# Patient Record
Sex: FEMALE | Race: WHITE | NOT HISPANIC OR LATINO | Employment: STUDENT | URBAN - METROPOLITAN AREA
[De-identification: names, ages, dates, MRNs, and addresses within clinical notes are randomized per-mention and may not be internally consistent; named-entity substitution may affect disease eponyms.]

---
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
## 2024-03-18 ENCOUNTER — OFFICE VISIT (OUTPATIENT)
Dept: FAMILY MEDICINE CLINIC | Facility: CLINIC | Age: 19
End: 2024-03-18
Payer: COMMERCIAL

## 2024-03-18 VITALS
RESPIRATION RATE: 16 BRPM | SYSTOLIC BLOOD PRESSURE: 120 MMHG | HEART RATE: 73 BPM | DIASTOLIC BLOOD PRESSURE: 76 MMHG | HEIGHT: 68 IN | BODY MASS INDEX: 22.76 KG/M2 | TEMPERATURE: 97.1 F | OXYGEN SATURATION: 98 % | WEIGHT: 150.2 LBS

## 2024-03-18 DIAGNOSIS — L68.0 HIRSUTISM: ICD-10-CM

## 2024-03-18 DIAGNOSIS — L70.0 ACNE VULGARIS: ICD-10-CM

## 2024-03-18 DIAGNOSIS — F41.9 ANXIETY: Primary | ICD-10-CM

## 2024-03-18 PROCEDURE — 99203 OFFICE O/P NEW LOW 30 MIN: CPT | Performed by: FAMILY MEDICINE

## 2024-03-18 RX ORDER — SERTRALINE HYDROCHLORIDE 25 MG/1
25 TABLET, FILM COATED ORAL DAILY
Qty: 90 TABLET | Refills: 1 | Status: SHIPPED | OUTPATIENT
Start: 2024-03-18

## 2024-03-18 RX ORDER — SPIRONOLACTONE 100 MG/1
100 TABLET, FILM COATED ORAL DAILY
Qty: 90 TABLET | Refills: 1 | Status: SHIPPED | OUTPATIENT
Start: 2024-03-18

## 2024-03-18 RX ORDER — SPIRONOLACTONE 100 MG/1
100 TABLET, FILM COATED ORAL DAILY
COMMUNITY
Start: 2024-01-03 | End: 2024-03-18 | Stop reason: SDUPTHER

## 2024-03-18 RX ORDER — SERTRALINE HYDROCHLORIDE 25 MG/1
25 TABLET, FILM COATED ORAL DAILY
COMMUNITY
End: 2024-03-18 | Stop reason: SDUPTHER

## 2024-03-18 NOTE — PROGRESS NOTES
Subjective     Patience Calle is a 18 y.o. female.     Chief Complaint   Patient presents with    [facility]    Med Refill    acne and hirsutism        History of Present Illness     To establish care, discuss the followings ;      Acne with Hirsutism ; doing well on spironolactone , no S/E reported.  She is also using some topical on her face for the acne    Anxiety; she I on zoloft for many years , runs out of it for 2 weeks.  No S/E reported     Labs and documentation from previous PCP / consulting physician has been reviewed          The following portions of the patient's history were reviewed and updated as appropriate: allergies, current medications, past family history, past medical history, past social history, past surgical history, and problem list.        Review of Systems   Psychiatric/Behavioral:  The patient is nervous/anxious.        Vitals:    03/18/24 1420   BP: 120/76   Pulse: 73   Resp: 16   Temp: 97.1 °F (36.2 °C)   SpO2: 98%           03/18/24  1420   Weight: 68.1 kg (150 lb 3.2 oz)         Body mass index is 22.84 kg/m².      Current Outpatient Medications   Medication Sig Dispense Refill    sertraline (ZOLOFT) 25 MG tablet Take 1 tablet by mouth Daily. 90 tablet 1    spironolactone (ALDACTONE) 100 MG tablet Take 1 tablet by mouth Daily. 90 tablet 1     No current facility-administered medications for this visit.                Objective   Physical Exam  Vitals and nursing note reviewed.   Constitutional:       General: She is not in acute distress.     Appearance: She is not toxic-appearing.   Cardiovascular:      Rate and Rhythm: Normal rate and regular rhythm.      Heart sounds: Normal heart sounds. No murmur heard.  Pulmonary:      Effort: Pulmonary effort is normal. No respiratory distress.      Breath sounds: Normal breath sounds. No stridor. No wheezing or rhonchi.   Neurological:      Mental Status: She is alert and oriented to person, place, and time.   Psychiatric:          Mood and Affect: Mood normal.         Behavior: Behavior normal.           Assessment & Plan   Diagnoses and all orders for this visit:    1. Anxiety (Primary)  -     sertraline (ZOLOFT) 25 MG tablet; Take 1 tablet by mouth Daily.  Dispense: 90 tablet; Refill: 1    2. Hirsutism  -     spironolactone (ALDACTONE) 100 MG tablet; Take 1 tablet by mouth Daily.  Dispense: 90 tablet; Refill: 1    3. Acne vulgaris  -     spironolactone (ALDACTONE) 100 MG tablet; Take 1 tablet by mouth Daily.  Dispense: 90 tablet; Refill: 1        Patient was given instructions and counseling regarding her condition or for health maintenance advice.   Please see specific information pulled into the AVS if appropriate.       I have fully discussed the nature of the medical condition(s) risks, complications, management, safe and proper use of medications.   Pt stated no allergy to the above prescribed medication.  I have discussed the SIDE EFFECT OF MEDICATION and importance TO report any side effect , the patient expressed good understanding.  Encouraged medication compliance and the importance of keeping scheduled follow up appointments with me and any other providers.    Patient instructed to follow up with our office for results on any labs/imaging ordered during this visit.    Home care discussed  All questions answered  Patient verbalizes understanding and agrees to treatment plan.     Follow up: Return in about 6 months (around 9/18/2024) for physical.

## 2024-04-11 ENCOUNTER — TELEPHONE (OUTPATIENT)
Dept: FAMILY MEDICINE CLINIC | Facility: CLINIC | Age: 19
End: 2024-04-11
Payer: COMMERCIAL

## 2024-04-11 DIAGNOSIS — L68.0 HIRSUTISM: ICD-10-CM

## 2024-04-11 DIAGNOSIS — F41.9 ANXIETY: ICD-10-CM

## 2024-04-11 DIAGNOSIS — L70.0 ACNE VULGARIS: ICD-10-CM

## 2024-04-11 RX ORDER — SPIRONOLACTONE 100 MG/1
100 TABLET, FILM COATED ORAL DAILY
Qty: 90 TABLET | Refills: 1 | Status: SHIPPED | OUTPATIENT
Start: 2024-04-11

## 2024-04-11 RX ORDER — SERTRALINE HYDROCHLORIDE 25 MG/1
25 TABLET, FILM COATED ORAL DAILY
Qty: 90 TABLET | Refills: 1 | Status: SHIPPED | OUTPATIENT
Start: 2024-04-11

## 2024-04-11 NOTE — TELEPHONE ENCOUNTER
Confirmed - pt did not  either rx and pharmacy reshelved. Both have been resent to pharmacy. Pt's mom informed v/u

## 2024-04-11 NOTE — TELEPHONE ENCOUNTER
Caller: SHAHZAD Calle    Relationship: Mother    Best call back number: [phone]     Which medication are you concerned about:     sertraline (ZOLOFT) 25 MG tablet       Who prescribed you this medication: DR MUNOZ        What are your concerns: PATIENT'S MOTHER IS CALLING TO STATE PATIENT DID NOT  MEDICATION AND PHARMACY PUT IT BACK IN STOCK.  SHE IS WANTING TO KNOW IF DR MUNOZ WOULD SEND AGAIN.        [id number] - [place] [address] [address] - [phone]  - [phone]  [phone]     PLEASE ADVISE.

## 2024-07-09 ENCOUNTER — OFFICE VISIT (OUTPATIENT)
Dept: FAMILY MEDICINE CLINIC | Facility: CLINIC | Age: 19
End: 2024-07-09
Payer: COMMERCIAL

## 2024-07-09 VITALS
SYSTOLIC BLOOD PRESSURE: 120 MMHG | TEMPERATURE: 97.8 F | BODY MASS INDEX: 23.01 KG/M2 | HEART RATE: 90 BPM | DIASTOLIC BLOOD PRESSURE: 64 MMHG | WEIGHT: 151.8 LBS | OXYGEN SATURATION: 98 % | HEIGHT: 68 IN | RESPIRATION RATE: 16 BRPM

## 2024-07-09 DIAGNOSIS — N39.0 ACUTE UTI: ICD-10-CM

## 2024-07-09 DIAGNOSIS — R35.0 URINE FREQUENCY: Primary | ICD-10-CM

## 2024-07-09 LAB
BILIRUB BLD-MCNC: NEGATIVE MG/DL
CLARITY, POC: CLEAR
COLOR UR: YELLOW
EXPIRATION DATE: ABNORMAL
GLUCOSE UR STRIP-MCNC: NEGATIVE MG/DL
KETONES UR QL: NEGATIVE
LEUKOCYTE EST, POC: ABNORMAL
Lab: ABNORMAL
NITRITE UR-MCNC: POSITIVE MG/ML
PH UR: 7.5 [PH] (ref 5–8)
PROT UR STRIP-MCNC: NEGATIVE MG/DL
RBC # UR STRIP: NEGATIVE /UL
SP GR UR: 1.02 (ref 1–1.03)
UROBILINOGEN UR QL: ABNORMAL

## 2024-07-09 PROCEDURE — 81003 URINALYSIS AUTO W/O SCOPE: CPT | Performed by: FAMILY MEDICINE

## 2024-07-09 PROCEDURE — 99213 OFFICE O/P EST LOW 20 MIN: CPT | Performed by: FAMILY MEDICINE

## 2024-07-09 RX ORDER — PHENAZOPYRIDINE HYDROCHLORIDE 100 MG/1
100 TABLET, FILM COATED ORAL 2 TIMES DAILY
Qty: 4 TABLET | Refills: 0 | Status: SHIPPED | OUTPATIENT
Start: 2024-07-09

## 2024-07-09 RX ORDER — NITROFURANTOIN 25; 75 MG/1; MG/1
100 CAPSULE ORAL 2 TIMES DAILY
Qty: 14 CAPSULE | Refills: 0 | Status: SHIPPED | OUTPATIENT
Start: 2024-07-09 | End: 2024-07-16

## 2024-07-09 NOTE — PROGRESS NOTES
Subjective     Patience Calle is a 18 y.o. female.     Chief Complaint   Patient presents with    Urinary Frequency     Also painful.x 3 days.        History of Present Illness     She is c/o lower abd pressure with pain and discomfort , dysuria, urgency , frequency and dark color urine for 3 days.     Labs has been ordered and personally/independently interpreted , discussed with pt       The following portions of the patient's history were reviewed and updated as appropriate: allergies, current medications, past family history, past medical history, past social history, past surgical history, and problem list.        Review of Systems   Genitourinary:  Positive for dysuria and frequency.       Vitals:    07/09/24 1037   BP: 120/64   Pulse: 90   Resp: 16   Temp: 97.8 °F (36.6 °C)   SpO2: 98%           07/09/24  1037   Weight: 68.9 kg (151 lb 12.8 oz)         Body mass index is 23.09 kg/m².      Current Outpatient Medications   Medication Sig Dispense Refill    sertraline (ZOLOFT) 25 MG tablet Take 1 tablet by mouth Daily. 90 tablet 1    spironolactone (ALDACTONE) 100 MG tablet Take 1 tablet by mouth Daily. 90 tablet 1    nitrofurantoin, macrocrystal-monohydrate, (Macrobid) 100 MG capsule Take 1 capsule by mouth 2 (Two) Times a Day for 7 days. 14 capsule 0    phenazopyridine (Pyridium) 100 MG tablet Take 1 tablet by mouth 2 (Two) Times a Day. 4 tablet 0     No current facility-administered medications for this visit.                Objective   Physical Exam  Vitals and nursing note reviewed.   Constitutional:       General: She is not in acute distress.     Appearance: She is not ill-appearing, toxic-appearing or diaphoretic.   Abdominal:      General: Bowel sounds are normal. There is no distension.      Palpations: Abdomen is soft.      Tenderness: There is no abdominal tenderness. There is no right CVA tenderness, left CVA tenderness, guarding or rebound.   Neurological:      Mental Status: She is alert and  oriented to person, place, and time.   Psychiatric:         Mood and Affect: Mood normal.         Behavior: Behavior normal.         Thought Content: Thought content normal.           Assessment & Plan   Diagnoses and all orders for this visit:    1. Urine frequency (Primary)  -     POC Urinalysis Dipstick, Automated    2. Acute UTI  Comments:  Discussed supportive care , plenty of fluids  Orders:  -     Urine Culture - Urine, Urine, Clean Catch  -     nitrofurantoin, macrocrystal-monohydrate, (Macrobid) 100 MG capsule; Take 1 capsule by mouth 2 (Two) Times a Day for 7 days.  Dispense: 14 capsule; Refill: 0  -     phenazopyridine (Pyridium) 100 MG tablet; Take 1 tablet by mouth 2 (Two) Times a Day.  Dispense: 4 tablet; Refill: 0        I spent 24 minutes caring for this patient on this date of service. This time includes time spent by me in the following activities:preparing for the visit,reviewing previous medical records,  performing a medically appropriate examination and/or evaluation, counseling and educating the patient/family/caregiver, and documenting information in the medical record.       Patient was given instructions and counseling regarding her condition or for health maintenance advice. Please see specific information pulled into the AVS if appropriate.       I have fully discussed the nature of the medical condition(s) risks, complications, management, safe and proper use of medications.   Pt stated no allergy to the above prescribed medication.  I have discussed the SIDE EFFECT OF MEDICATION and importance TO report any side effect , the patient expressed good understanding.  Encouraged medication compliance and the importance of keeping scheduled follow up appointments with me and any other providers.    Patient instructed to follow up with our office for results on any labs/imaging ordered during this visit.    Home care discussed  All questions answered  Patient verbalizes understanding and  agrees to treatment plan.     Follow up: Return for WILL CALL YOU FOR LBAS/XR RESULT.

## 2024-07-11 LAB
BACTERIA UR CULT: NORMAL
BACTERIA UR CULT: NORMAL

## 2024-10-14 DIAGNOSIS — N39.0 ACUTE UTI: ICD-10-CM

## 2024-10-14 RX ORDER — NITROFURANTOIN 25; 75 MG/1; MG/1
100 CAPSULE ORAL 2 TIMES DAILY
Qty: 14 CAPSULE | Refills: 0 | OUTPATIENT
Start: 2024-10-14

## 2024-10-16 DIAGNOSIS — F41.9 ANXIETY: ICD-10-CM

## 2024-10-16 RX ORDER — SERTRALINE HYDROCHLORIDE 25 MG/1
25 TABLET, FILM COATED ORAL DAILY
Qty: 90 TABLET | Refills: 1 | Status: SHIPPED | OUTPATIENT
Start: 2024-10-16

## 2024-10-16 NOTE — TELEPHONE ENCOUNTER
Caller: SHAHZAD MERCHANT    Relationship: Mother    Best call back number: [phone]     Requested Prescriptions:   Requested Prescriptions     Pending Prescriptions Disp Refills    sertraline (ZOLOFT) 25 MG tablet 90 tablet 1     Sig: Take 1 tablet by mouth Daily.        Pharm[facility] where [facility] be sent[id number] [place], [place] [facility] [address]  AT [address] [address] - [phone]  - [phone]     Last office visit with prescribing clinician: 7/9/2024   Last telemedicine visit with prescribing clinician: Visit date not found   Next office visit with prescribing clinician: Visit date not found     Additional details provided by patient: WILL NEED NEW PRESCRIPTION    Does the patient have less than a 3 day supply:  [] Yes  [x] No    Would you like a call back once the refill request has been completed: [] Yes [] No    If the office needs to give you a call back, can they leave a voicemail: [] Yes [] No    Karan Church Rep   10/16/24 12:53 EDT

## 2025-01-20 ENCOUNTER — TELEPHONE (OUTPATIENT)
Dept: FAMILY MEDICINE CLINIC | Facility: CLINIC | Age: 20
End: 2025-01-20

## 2025-01-20 NOTE — TELEPHONE ENCOUNTER
Hub staff attempted to follow warm transfer process and was unsuccessful     Caller: SHAHZAD Calle    Relationship to patient: Mother    Best call back number: [phone]     Patient is needing: PATIENT IS REQUESTING A SAME DAY APPOINTMENT FOR VOMITING AND ABDOMINAL PAIN .

## 2025-04-09 ENCOUNTER — OFFICE VISIT (OUTPATIENT)
Dept: OBSTETRICS AND GYNECOLOGY | Facility: CLINIC | Age: 20
End: 2025-04-09
Payer: COMMERCIAL

## 2025-04-09 VITALS
WEIGHT: 146 LBS | SYSTOLIC BLOOD PRESSURE: 130 MMHG | HEIGHT: 68 IN | BODY MASS INDEX: 22.13 KG/M2 | DIASTOLIC BLOOD PRESSURE: 78 MMHG

## 2025-04-09 DIAGNOSIS — Z30.011 ENCOUNTER FOR INITIAL PRESCRIPTION OF CONTRACEPTIVE PILLS: ICD-10-CM

## 2025-04-09 DIAGNOSIS — Z00.00 WELL WOMAN EXAM (NO GYNECOLOGICAL EXAM): Primary | ICD-10-CM

## 2025-04-09 DIAGNOSIS — N94.6 DYSMENORRHEA: ICD-10-CM

## 2025-04-09 DIAGNOSIS — L68.0 FEMALE HIRSUTISM: ICD-10-CM

## 2025-04-09 DIAGNOSIS — Z76.89 ENCOUNTER TO ESTABLISH CARE: ICD-10-CM

## 2025-04-09 DIAGNOSIS — N92.1 MENORRHAGIA WITH IRREGULAR CYCLE: ICD-10-CM

## 2025-04-09 NOTE — PROGRESS NOTES
GYN ANNUAL EXAM     Chief Complaint   Patient presents with    ngyn     Here today for B/C.Has bad periods and cramping,heavy,irregular and has also passed out x2.       ISRAEL Morin is a 19 y.o. female who presents for annual well woman exam. She is a new patient to our practice. she reports to get dysmenorrhea today along with irregular menses and concerns about acne as well as hair growth on her belly.  The hair growth is of particular concern to her    OB History    No obstetric history on file.         SUBJECTIVE    MENSTRUAL & SEXUAL HEALTH  LMP: Patient's last menstrual period was 04/01/2025.  Menses regularity: irregular for the last 6 to 7 months after discontinuation of her previous oral contraceptive pills.  She was on those for 4 years and sometimes skipped periods.  Dysmenorrhea: severe, occurring throughout menses  Cyclic symptoms: none  Current contraception: none  Last pap: n/a  History of abnormal pap: n/a  History of STD: No  Family history of cancer: denies       Family history of breast cancer: no  Performs SBE: does not perform.   Incontinence: Patient reports that she is not currently experiencing any symptoms of urinary incontinence.       Past Medical History:   Diagnosis Date    Anxiety        Past Surgical History:   Procedure Laterality Date    KNEE ACL RECONSTRUCTION Left 2021         Current Outpatient Medications:     spironolactone (ALDACTONE) 100 MG tablet, Take 1 tablet by mouth Daily., Disp: 90 tablet, Rfl: 1    sertraline (ZOLOFT) 25 MG tablet, TAKE 1 TABLET BY MOUTH DAILY, Disp: 90 tablet, Rfl: 1    No Known Allergies    Social History     Tobacco Use    Smoking status: Never    Smokeless tobacco: Never    Tobacco comments:     vap   Vaping Use    Vaping status: Every Day    Substances: Nicotine    Devices: Disposable   Substance Use Topics    Alcohol use: Yes     Comment: social    Drug use: Yes     Types: Marijuana       Family History   Problem Relation Age of Onset     "Crohn's disease Mother     No Known Problems Father        Review of Systems   Constitutional:  Negative for fatigue, unexpected weight gain and unexpected weight loss.   Gastrointestinal:  Negative for abdominal pain.   Genitourinary:  Positive for menstrual problem. Negative for decreased libido, difficulty urinating, dyspareunia, dysuria, pelvic pain, pelvic pressure, urgency, urinary incontinence and vaginal discharge.   All other systems reviewed and are negative.      OBJECTIVE    /78   Ht 172.7 cm (67.99\")   Wt 66.2 kg (146 lb)   LMP 04/01/2025   BMI 22.20 kg/m²     Physical Exam  Constitutional:       General: She is not in acute distress.     Appearance: Normal appearance. She is not ill-appearing.   HENT:      Head: Normocephalic and atraumatic.   Eyes:      General: No scleral icterus.  Cardiovascular:      Rate and Rhythm: Normal rate and regular rhythm.   Pulmonary:      Effort: Pulmonary effort is normal.      Breath sounds: Normal breath sounds.   Musculoskeletal:         General: Normal range of motion.      Cervical back: Normal range of motion.   Neurological:      General: No focal deficit present.      Mental Status: She is alert and oriented to person, place, and time. Mental status is at baseline.   Skin:     General: Skin is warm and dry.   Psychiatric:         Mood and Affect: Mood normal.         Behavior: Behavior normal.         Thought Content: Thought content normal.         Judgment: Judgment normal.   Vitals reviewed.         ASSESSMENT     Diagnoses and all orders for this visit:    1. Well woman exam (no gynecological exam) (Primary)    2. Dysmenorrhea    3. Menorrhagia with irregular cycle  -     Hemoglobin & Hematocrit, Blood  -     Folate  -     Ferritin  -     Iron Profile  -     TSH  -     T4, free    4. Encounter to establish care         PLAN   WELL WOMAN EXAM: Pap smear deferred today due to limited evidence on efficacy in persons younger than 21 years of age. " Recommend MVI daily.    CONTRACEPTION: OCP (estrogen/progesterone) refilled x 1 year - [facility] information provided via AVS.  She was provided with 3 months of Nextstellis birth control.  She will let us know if that is satisfactory for her in around 3 months..  HIRSUTISM: Encouraged parents to stop her spironolactone dosing.  Discussed hair removal via electrolysis at home using a home device such as the in Nood hair remover.  Consider drawing androgen labs if hirsutism is uncontrolled.  SMOKING STATUS: non smoker.  BREAST HEALTH: Encouraged annual mammogram screening starting at age 40. Instructed on how to perform SBE. Encouraged breast health self awareness.  EXERCISE: Encouraged 150 minutes of exercise per week if not medially contraindicated.   BMI: Body mass index is 22.2 kg/m².  MENORRHAGIA: Labs ordered to assess for potential causes of menorrhagia.  Will notify patient and treat as indicated.    Return for annual exam or as needed before.    I spent 30 minutes caring for Patience on this date of service. This time includes time spent by me in the following activities: preparing for the visit, reviewing tests, performing a medically appropriate examination and/or evaluation, counseling and educating the patient/family/caregiver, referring and communicating with other health care professionals, documenting information in the medical record, independently interpreting results and communicating that information with the patient/family/caregiver, care coordination, ordering medications, ordering test(s), ordering procedure(s), obtaining a separately obtained history, and reviewing a separately obtained history.    Harini Sotelo CNM  4/19/2025  11:11 EDT

## 2025-04-11 ENCOUNTER — PATIENT MESSAGE (OUTPATIENT)
Dept: OBSTETRICS AND GYNECOLOGY | Facility: CLINIC | Age: 20
End: 2025-04-11
Payer: COMMERCIAL

## 2025-04-11 ENCOUNTER — PATIENT ROUNDING (BHMG ONLY) (OUTPATIENT)
Dept: OBSTETRICS AND GYNECOLOGY | Facility: CLINIC | Age: 20
End: 2025-04-11
Payer: COMMERCIAL

## 2025-04-17 DIAGNOSIS — F41.9 ANXIETY: ICD-10-CM

## 2025-04-17 RX ORDER — SERTRALINE HYDROCHLORIDE 25 MG/1
25 TABLET, FILM COATED ORAL DAILY
Qty: 90 TABLET | Refills: 1 | Status: SHIPPED | OUTPATIENT
Start: 2025-04-17

## 2025-04-19 RX ORDER — DROSPIRENONE AND ESTETROL 3-14.2(28)
1 KIT ORAL DAILY
Qty: 84 TABLET | Refills: 3 | Status: SHIPPED | OUTPATIENT
Start: 2025-04-19

## 2025-04-30 LAB
FERRITIN SERPL-MCNC: 99 NG/ML (ref 15–150)
FOLATE SERPL-MCNC: 8.9 NG/ML
HCT VFR BLD AUTO: 41.9 % (ref 34–46.6)
HGB BLD-MCNC: 13.7 G/DL (ref 11.1–15.9)
IRON SATN MFR SERPL: 28 % (ref 15–55)
IRON SERPL-MCNC: 88 UG/DL (ref 27–159)
T4 FREE SERPL-MCNC: 1.35 NG/DL (ref 0.82–1.77)
TIBC SERPL-MCNC: 314 UG/DL (ref 250–450)
TSH SERPL DL<=0.005 MIU/L-ACNC: 0.86 UIU/ML (ref 0.45–4.5)
UIBC SERPL-MCNC: 226 UG/DL (ref 131–425)

## 2025-07-20 DIAGNOSIS — F41.9 ANXIETY: ICD-10-CM

## 2025-07-21 RX ORDER — SERTRALINE HYDROCHLORIDE 25 MG/1
25 TABLET, FILM COATED ORAL DAILY
Qty: 90 TABLET | Refills: 1 | OUTPATIENT
Start: 2025-07-21

## 2025-07-25 DIAGNOSIS — F41.9 ANXIETY: ICD-10-CM

## 2025-07-25 NOTE — TELEPHONE ENCOUNTER
Rx Refill Note  Requested Prescriptions     Pending Prescriptions Disp Refills    sertraline (ZOLOFT) 25 MG tablet [Pharmacy Med Name: SERTRALINE 25MG TABLETS] 90 tablet 1     Sig: TAKE 1 TABLET BY MOUTH DAILY      Last office visit with prescribing clinician: 7/9/2024   Last telemedicine visit with prescribing clinician: Visit date not found   Next office visit with prescribing clinician: Visit date not found                         Would you like a call back once the refill request has been completed: [] Yes [] No    If the office needs to give you a call back, can they leave a voicemail: [] Yes [] No    Alivia Walter MA  07/25/25, 08:07 EDT

## 2025-07-27 RX ORDER — SERTRALINE HYDROCHLORIDE 25 MG/1
25 TABLET, FILM COATED ORAL DAILY
Qty: 15 TABLET | Refills: 0 | Status: SHIPPED | OUTPATIENT
Start: 2025-07-27

## 2025-08-01 ENCOUNTER — TELEPHONE (OUTPATIENT)
Dept: OBSTETRICS AND GYNECOLOGY | Facility: CLINIC | Age: 20
End: 2025-08-01
Payer: COMMERCIAL

## 2025-08-15 RX ORDER — DROSPIRENONE AND ETHINYL ESTRADIOL 0.02-3(28)
1 KIT ORAL DAILY
Qty: 84 TABLET | Refills: 3 | Status: SHIPPED | OUTPATIENT
Start: 2025-08-15